# Patient Record
Sex: MALE | URBAN - METROPOLITAN AREA
[De-identification: names, ages, dates, MRNs, and addresses within clinical notes are randomized per-mention and may not be internally consistent; named-entity substitution may affect disease eponyms.]

---

## 2023-11-14 ENCOUNTER — LAB OUTSIDE AN ENCOUNTER (OUTPATIENT)
Dept: URBAN - METROPOLITAN AREA CLINIC 98 | Facility: CLINIC | Age: 58
End: 2023-11-14

## 2023-11-14 ENCOUNTER — DASHBOARD ENCOUNTERS (OUTPATIENT)
Age: 58
End: 2023-11-14

## 2023-11-14 ENCOUNTER — OFFICE VISIT (OUTPATIENT)
Dept: URBAN - METROPOLITAN AREA CLINIC 98 | Facility: CLINIC | Age: 58
End: 2023-11-14
Payer: COMMERCIAL

## 2023-11-14 VITALS
HEART RATE: 70 BPM | DIASTOLIC BLOOD PRESSURE: 66 MMHG | SYSTOLIC BLOOD PRESSURE: 138 MMHG | HEIGHT: 66 IN | WEIGHT: 192.6 LBS | BODY MASS INDEX: 30.95 KG/M2 | TEMPERATURE: 97 F

## 2023-11-14 DIAGNOSIS — E66.9 OBESITY (BMI 30-39.9): ICD-10-CM

## 2023-11-14 DIAGNOSIS — Z12.11 COLON CANCER SCREENING: ICD-10-CM

## 2023-11-14 DIAGNOSIS — K42.9 REDUCIBLE UMBILICAL HERNIA: ICD-10-CM

## 2023-11-14 DIAGNOSIS — J44.9 COPD, MODERATE: ICD-10-CM

## 2023-11-14 PROBLEM — 313297008: Status: ACTIVE | Noted: 2023-11-14

## 2023-11-14 PROBLEM — 162864005: Status: ACTIVE | Noted: 2023-11-14

## 2023-11-14 PROCEDURE — 99244 OFF/OP CNSLTJ NEW/EST MOD 40: CPT | Performed by: INTERNAL MEDICINE

## 2023-11-14 PROCEDURE — 99204 OFFICE O/P NEW MOD 45 MIN: CPT | Performed by: INTERNAL MEDICINE

## 2023-11-14 RX ORDER — MONTELUKAST 10 MG/1
1 TABLET TABLET, FILM COATED ORAL ONCE A DAY
Status: ACTIVE | COMMUNITY
Start: 2023-11-14

## 2023-11-14 RX ORDER — ATORVASTATIN CALCIUM 10 MG/1
1 TABLET TABLET, FILM COATED ORAL ONCE A DAY
Status: ACTIVE | COMMUNITY
Start: 2023-11-14

## 2023-11-14 RX ORDER — SODIUM, POTASSIUM,MAG SULFATES 17.5-3.13G
354ML SOLUTION, RECONSTITUTED, ORAL ORAL
Qty: 345 MILLILITER | Refills: 0 | OUTPATIENT
Start: 2023-11-14 | End: 2023-11-15

## 2023-11-14 RX ORDER — PANTOPRAZOLE SODIUM 40 MG/1
1 TABLET TABLET, DELAYED RELEASE ORAL ONCE A DAY
Status: ACTIVE | COMMUNITY
Start: 2023-11-14

## 2023-11-14 RX ORDER — MEPOLIZUMAB 100 MG/ML
AS DIRECTED INJECTION, POWDER, FOR SOLUTION SUBCUTANEOUS
Status: ACTIVE | COMMUNITY
Start: 2023-11-14

## 2023-11-14 RX ORDER — FLUTICASONE PROPIONATE AND SALMETEROL 50; 100 UG/1; UG/1
1 PUFF POWDER RESPIRATORY (INHALATION) TWICE A DAY
Status: ACTIVE | COMMUNITY
Start: 2023-11-14

## 2023-11-14 NOTE — HPI-TODAY'S VISIT:
Patient referred by Bryce Cm MD for CRC screening. Copy of this consult OV sent to Dr. Cm. 57 yo pt, originally from [facility] the [place] 7 yrs, who's due for CRC screening. He has been asx from the GI point of view: no abdominal pain, change in bowel pattern, no melenic stools - hematochezia and no constitutional sxs. Patient has no FHx CC / pp / IBD / GI malignancies. Had a normal CPE w PCP recently. No cardiorespiratory sxs. No urologic nor constitutional sxs.He has been c/o of painless UH, which seems to have increased in size lately. He has a Hx COPD / restrictive lung disease on Nucala q mo / bronchodilators / Montelukast. Has had few COPD exacerbation during the past year. He's been followed by Pulmonary Dr. Charisse Cheema.  No other complaints after extensive ROS.

## 2023-11-14 NOTE — PHYSICAL EXAM CONSTITUTIONAL:
well developed, well nourished , in no acute distress , ambulating without difficulty , normal communication ability 31.9

## 2024-01-25 ENCOUNTER — OFFICE VISIT (OUTPATIENT)
Dept: URBAN - METROPOLITAN AREA SURGERY CENTER 18 | Facility: SURGERY CENTER | Age: 59
End: 2024-01-25